# Patient Record
Sex: FEMALE | Race: WHITE | NOT HISPANIC OR LATINO | Employment: FULL TIME | URBAN - METROPOLITAN AREA
[De-identification: names, ages, dates, MRNs, and addresses within clinical notes are randomized per-mention and may not be internally consistent; named-entity substitution may affect disease eponyms.]

---

## 2021-08-23 ENCOUNTER — TELEPHONE (OUTPATIENT)
Dept: OBGYN CLINIC | Facility: CLINIC | Age: 61
End: 2021-08-23

## 2021-08-23 DIAGNOSIS — N95.2 ATROPHIC VAGINITIS: Primary | ICD-10-CM

## 2021-08-23 NOTE — TELEPHONE ENCOUNTER
Patient l/m requesting her Premarin 0 625 cream to be transmitted to [facility] as on file  She have a WA scheduled for 12/14/21 w/ Dr José Miguel Salamanca please address for Dr José Miguel Shoemaker since he is on vacation for 2 weeks

## 2021-12-14 ENCOUNTER — ANNUAL EXAM (OUTPATIENT)
Dept: OBGYN CLINIC | Facility: CLINIC | Age: 61
End: 2021-12-14
Payer: COMMERCIAL

## 2021-12-14 VITALS
DIASTOLIC BLOOD PRESSURE: 60 MMHG | WEIGHT: 149.2 LBS | SYSTOLIC BLOOD PRESSURE: 110 MMHG | HEIGHT: 65 IN | BODY MASS INDEX: 24.86 KG/M2

## 2021-12-14 DIAGNOSIS — N95.2 ATROPHIC VAGINITIS: ICD-10-CM

## 2021-12-14 DIAGNOSIS — Z12.31 BREAST CANCER SCREENING BY MAMMOGRAM: ICD-10-CM

## 2021-12-14 DIAGNOSIS — Z80.3 FAMILY HISTORY OF BREAST CANCER IN FIRST DEGREE RELATIVE: ICD-10-CM

## 2021-12-14 DIAGNOSIS — Z12.4 SCREENING FOR MALIGNANT NEOPLASM OF THE CERVIX: ICD-10-CM

## 2021-12-14 DIAGNOSIS — Z01.419 ROUTINE GYNECOLOGICAL EXAMINATION: Primary | ICD-10-CM

## 2021-12-14 PROCEDURE — S0612 ANNUAL GYNECOLOGICAL EXAMINA: HCPCS | Performed by: OBSTETRICS & GYNECOLOGY

## 2021-12-14 RX ORDER — LISINOPRIL 5 MG/1
1 TABLET ORAL DAILY
COMMUNITY

## 2021-12-21 LAB
CYTOLOGIST CVX/VAG CYTO: NORMAL
DX ICD CODE: NORMAL
HPV I/H RISK 4 DNA CVX QL PROBE+SIG AMP: NEGATIVE
Lab: NORMAL
OTHER STN SPEC: NORMAL
PATH REPORT.FINAL DX SPEC: NORMAL
SL AMB NOTE:: NORMAL
SL AMB SPECIMEN ADEQUACY: NORMAL
SL AMB TEST METHODOLOGY: NORMAL

## 2022-08-03 DIAGNOSIS — N95.2 ATROPHIC VAGINITIS: ICD-10-CM

## 2022-09-08 DIAGNOSIS — Z80.3 FAMILY HISTORY OF BREAST CANCER IN FIRST DEGREE RELATIVE: ICD-10-CM

## 2022-09-08 DIAGNOSIS — Z12.31 BREAST CANCER SCREENING BY MAMMOGRAM: ICD-10-CM

## 2022-09-26 NOTE — TELEPHONE ENCOUNTER
Patient called requesting refill of the Premarin cream to [facility] as on file  She states [facility] had tried to reach out to Dr Michi Yoon multiple time electronically and no response  Dr Ayaka Wilkes address and pt request a call back

## 2022-09-27 RX ORDER — CONJUGATED ESTROGENS 0.62 MG/G
CREAM VAGINAL
Qty: 30 G | Refills: 2 | Status: SHIPPED | OUTPATIENT
Start: 2022-09-27

## 2022-09-30 ENCOUNTER — TELEPHONE (OUTPATIENT)
Dept: OBGYN CLINIC | Facility: CLINIC | Age: 62
End: 2022-09-30

## 2022-09-30 NOTE — TELEPHONE ENCOUNTER
Patient l/m stating yesterday she called the pharmacy and they states Dr Lorrie Steven had not transmitted the rx for the premarin cream yet  Upon checking the chart, Dr Lorrie Steven did sent it on Tuesday but the electronic transmission failed  Called [facility] pharmacy and called in the script as written  L/m on pt's vm advising of the called in script and to call [facility] to make sure it is ready for

## 2022-12-20 ENCOUNTER — ANNUAL EXAM (OUTPATIENT)
Dept: OBGYN CLINIC | Facility: CLINIC | Age: 62
End: 2022-12-20

## 2022-12-20 VITALS
SYSTOLIC BLOOD PRESSURE: 140 MMHG | HEIGHT: 65 IN | BODY MASS INDEX: 25.02 KG/M2 | WEIGHT: 150.2 LBS | DIASTOLIC BLOOD PRESSURE: 78 MMHG

## 2022-12-20 DIAGNOSIS — Z01.419 ROUTINE GYNECOLOGICAL EXAMINATION: Primary | ICD-10-CM

## 2022-12-20 DIAGNOSIS — N95.2 ATROPHIC VAGINITIS: ICD-10-CM

## 2022-12-20 DIAGNOSIS — Z80.3 FAMILY HISTORY OF BREAST CANCER: ICD-10-CM

## 2022-12-20 DIAGNOSIS — Z12.31 ENCOUNTER FOR SCREENING MAMMOGRAM FOR MALIGNANT NEOPLASM OF BREAST: ICD-10-CM

## 2022-12-20 NOTE — PROGRESS NOTES
[address] [place] [address], [address], [facility], [facility]    ASSESSMENT/PLAN: Mendel Le is a 58 y o   who presents for annual gynecologic exam     Encounter for routine gynecologic examination  - Routine well woman exam completed today  - Cervical Cancer Screening: Current ASCCP Guidelines reviewed  Last Pap: 2021   Next Pap Due:   - HPV Vaccination status: Not immunized  - Contraceptive counseling discussed  Current contraception: none  - Breast Cancer Screening: Last Mammogram 2022, ordered  - Colorectal cancer screening was not ordered  - The following were reviewed in today's visit: breast self exam, mammography screening ordered, menopause and adequate intake of calcium and vitamin D    Additional problems addressed during this visit:  1  Routine gynecological examination    2  Atrophic vaginitis  Assessment & Plan:  Premarin refilled recently for another year      3  Encounter for screening mammogram for malignant neoplasm of breast  -     Mammo screening bilateral w 3d & cad; Future    4  Family history of breast cancer  -     Mammo screening bilateral w 3d & cad; Future      CC:  Annual Gynecologic Examination    HPI: Mendel Le is a 58 y o   who presents for annual gynecologic examination  HPI    The following portions of the patient's history were reviewed and updated as appropriate: She  has a past medical history of History of screening mammography (2022) and Hypertension  She  has a past surgical history that includes Mammo (historical) (Bilateral, 08/10/2020); Colonoscopy; Tonsillectomy;  section; and Myomectomy ()  Her family history includes Breast cancer (age of onset: 79) in her maternal aunt and paternal aunt; Coronary artery disease in her mother  She  reports that she has never smoked  She has never used smokeless tobacco  She reports current alcohol use  She reports that she does not use drugs    Current Outpatient Medications   Medication Sig Dispense Refill   • Calcium Carb-Cholecalciferol (Calcium Carbonate-Vitamin D3) 600-400 MG-UNIT TABS Take 1 tablet by mouth daily     • lisinopril (ZESTRIL) 5 mg tablet Take 1 tablet by mouth daily     • Multiple Vitamins-Minerals (HAIR/SKIN/NAILS/BIOTIN PO) Take 1 gum by mouth daily     • Multiple Vitamins-Minerals (Multivitamin Women 50+) TABS Take 1 tablet by mouth every 24 hours     • OMEGA 3-6-9 FATTY ACIDS PO Take 1 capsule by mouth daily     • Premarin vaginal cream INSERT 0 5 GM INTO THE VAGINA TWICE WEEKLY 30 g 2   • Probiotic Product (PROBIOTIC-10 PO) Take 1 tablet by mouth every 24 hours     • Specialty Vitamins Products (COLLAGEN ULTRA PO) Take by mouth daily       No current facility-administered medications for this visit  She is allergic to codeine, vicodin [hydrocodone-acetaminophen], and penicillins       Review of Systems      Objective:  /78   Ht 5' 5" (1 651 m)   Wt 68 1 kg (150 lb 3 2 oz)   Breastfeeding No   BMI 24 99 kg/m²    Physical Exam      PE:  General Appearance: alert and oriented, in no acute distress  HEENT: PERRL, thyroid without masses or tenderness  Breast: No masses, tenderness, skin changes, nipple D/C or axillary or supraclavicular adenopathy  Abdomen: Soft, non-tender, non-distended, no masses, no rebound or guarding  Pelvic:       External genitalia: Normal appearance, no abnormal pigmentation, no lesions or masses  Normal Bartholin's and [place]'s  Urinary system: Urethral meatus normal, bladder non-tender  Vaginal: normal mucosa without prolapse or lesions  Normal-appearing physiologic discharge      Cervix: Normal-appearing, well-epithelialized, no gross lesions or masses No cervical motion tenderness  Adnexa: No adnexal masses or tenderness noted  Uterus: Normal-sized, regular contour, midline, mobile, no uterine tenderness  Extremities: Normal range of motion     Skin: normal, no rash or abnormalities  Neurologic: alert, oriented x3  Psychiatric: Appropriate affect, mood stable, cooperative with exam

## 2023-11-20 ENCOUNTER — TELEPHONE (OUTPATIENT)
Dept: OBGYN CLINIC | Facility: CLINIC | Age: 63
End: 2023-11-20

## 2023-11-20 DIAGNOSIS — N95.2 ATROPHIC VAGINITIS: ICD-10-CM

## 2023-11-20 RX ORDER — CONJUGATED ESTROGENS 0.62 MG/G
1 CREAM VAGINAL 2 TIMES WEEKLY
Qty: 30 G | Refills: 2 | Status: SHIPPED | OUTPATIENT
Start: 2023-11-20

## 2023-11-20 NOTE — TELEPHONE ENCOUNTER
Cassidy Garcia scheduled WA for 01/24/2024. She is requesting Premarin cream renewal to [facility])   Medication renewal message forward to Dr. Ceci Sanchez.

## 2024-01-24 ENCOUNTER — ANNUAL EXAM (OUTPATIENT)
Dept: OBGYN CLINIC | Facility: CLINIC | Age: 64
End: 2024-01-24
Payer: COMMERCIAL

## 2024-01-24 VITALS
DIASTOLIC BLOOD PRESSURE: 82 MMHG | WEIGHT: 153 LBS | SYSTOLIC BLOOD PRESSURE: 132 MMHG | HEIGHT: 64 IN | BODY MASS INDEX: 26.12 KG/M2

## 2024-01-24 DIAGNOSIS — Z12.31 ENCOUNTER FOR SCREENING MAMMOGRAM FOR MALIGNANT NEOPLASM OF BREAST: ICD-10-CM

## 2024-01-24 DIAGNOSIS — N95.2 ATROPHIC VAGINITIS: ICD-10-CM

## 2024-01-24 DIAGNOSIS — Z01.419 ROUTINE GYNECOLOGICAL EXAMINATION: Primary | ICD-10-CM

## 2024-01-24 PROCEDURE — S0612 ANNUAL GYNECOLOGICAL EXAMINA: HCPCS | Performed by: OBSTETRICS & GYNECOLOGY

## 2024-01-24 RX ORDER — MULTIVIT WITH MINERALS/LUTEIN
TABLET ORAL EVERY 24 HOURS
COMMUNITY

## 2024-01-24 RX ORDER — CONJUGATED ESTROGENS 0.62 MG/G
1 CREAM VAGINAL 2 TIMES WEEKLY
Qty: 30 G | Refills: 3 | Status: SHIPPED | OUTPATIENT
Start: 2024-01-25

## 2024-01-24 RX ORDER — BACILLUS COAGULANS/INULIN 1B-250 MG
CAPSULE ORAL EVERY 24 HOURS
COMMUNITY

## 2024-01-24 RX ORDER — CRANBERRY FRUIT EXTRACT 200 MG
CAPSULE ORAL EVERY 24 HOURS
COMMUNITY

## 2024-01-24 NOTE — PROGRESS NOTES
[facility] [address], [address], [place] [address]    ASSESSMENT/PLAN: Jeane Fabian is a 63 y.o.  who presents for annual gynecologic exam.    Encounter for routine gynecologic examination  - Routine well woman exam completed today.  - Cervical Cancer Screening: Current ASCCP Guidelines reviewed. Last Pap: 2021 . Next Pap Due: next wellness  - HPV Vaccination status: Not immunized  - Contraceptive counseling discussed.  Current contraception: none  - Breast Cancer Screening: Last Mammogram 2022, ordered  - Colorectal cancer screening was not ordered.  - The following were reviewed in today's visit: breast self exam, mammography screening ordered, and use and side effects of HRT    Additional problems addressed during this visit:  1. Routine gynecological examination    2. Encounter for screening mammogram for malignant neoplasm of breast  -     Mammo screening bilateral w 3d & cad; Future    3. Atrophic vaginitis  Assessment & Plan:  Premarin cream working well. Refill sent    Orders:  -     estrogens, conjugated (Premarin) vaginal cream; Insert 1 g into the vagina 2 (two) times a week        CC:  Annual Gynecologic Examination    HPI: Jeane Fabian is a 63 y.o.  who presents for annual gynecologic examination.  HPI    The following portions of the patient's history were reviewed and updated as appropriate: She  has a past medical history of History of screening mammography (2022) and Hypertension.  She  has a past surgical history that includes Mammo (historical) (Bilateral, 08/10/2020); Colonoscopy; Tonsillectomy;  section; and Myomectomy ().  Her family history includes Breast cancer (age of onset: 70) in her maternal aunt and paternal aunt; Coronary artery disease in her mother; Hypertension in her father and mother.  She  reports that she has never smoked. She has never been exposed to tobacco smoke. She has never used smokeless  "tobacco. She reports current alcohol use of about 2.0 standard drinks of alcohol per week. She reports that she does not use drugs.  Current Outpatient Medications   Medication Sig Dispense Refill    Ascorbic Acid (vitamin C) 1000 MG tablet every 24 hours      Bacillus Coagulans-Inulin (Probiotic) 1-250 BILLION-MG CAPS every 24 hours      Calcium Carb-Cholecalciferol (Calcium Carbonate-Vitamin D3) 600-400 MG-UNIT TABS Take 1 tablet by mouth daily      [START ON 1/25/2024] estrogens, conjugated (Premarin) vaginal cream Insert 1 g into the vagina 2 (two) times a week 30 g 3    lisinopril (ZESTRIL) 5 mg tablet Take 1 tablet by mouth daily      Multiple Vitamins-Minerals (HAIR/SKIN/NAILS/BIOTIN PO) Take 1 gum by mouth daily      Multiple Vitamins-Minerals (Multivitamin Women 50+) TABS Take 1 tablet by mouth every 24 hours      OMEGA 3-6-9 FATTY ACIDS PO Take 1 capsule by mouth daily      Probiotic Product (PROBIOTIC-10 PO) Take 1 tablet by mouth every 24 hours      Red Yeast Rice Extract 600 MG CAPS every 24 hours      Specialty Vitamins Products (COLLAGEN ULTRA PO) Take by mouth daily      Multiple Vitamins-Minerals (HAIR SKIN AND NAILS FORMULA PO) as directed Orally       No current facility-administered medications for this visit.     She is allergic to codeine, vicodin [hydrocodone-acetaminophen], and penicillins..    Review of Systems      Objective:  /82 (BP Location: Left arm, Patient Position: Sitting, Cuff Size: Standard)   Ht 5' 4\" (1.626 m)   Wt 69.4 kg (153 lb)   BMI 26.26 kg/m²    Physical Exam      PE:  General Appearance: alert and oriented, in no acute distress.   HEENT: PERRL, thyroid without masses or tenderness  Breast: No masses, tenderness, skin changes, nipple D/C or axillary or supraclavicular adenopathy  Abdomen: Soft, non-tender, non-distended, no masses, no rebound or guarding.  Pelvic:       External genitalia: Normal appearance, no abnormal pigmentation, no lesions or masses. Normal " Bartholin's and [place]'s.      Urinary system: Urethral meatus normal, bladder non-tender.      Vaginal: normal mucosa without prolapse or lesions. Normal-appearing physiologic discharge      Cervix: Normal-appearing, well-epithelialized, no gross lesions or masses No cervical motion tenderness.      Adnexa: No adnexal masses or tenderness noted.      Uterus: Normal-sized, regular contour, midline, mobile, no uterine tenderness.  Extremities: Normal range of motion.   Skin: normal, no rash or abnormalities  Neurologic: alert, oriented x3  Psychiatric: Appropriate affect, mood stable, cooperative with exam.

## 2024-11-13 DIAGNOSIS — Z12.31 ENCOUNTER FOR SCREENING MAMMOGRAM FOR MALIGNANT NEOPLASM OF BREAST: ICD-10-CM

## 2024-12-24 ENCOUNTER — RESULTS FOLLOW-UP (OUTPATIENT)
Dept: OBGYN CLINIC | Facility: CLINIC | Age: 64
End: 2024-12-24

## 2025-01-28 ENCOUNTER — ANNUAL EXAM (OUTPATIENT)
Dept: OBGYN CLINIC | Facility: CLINIC | Age: 65
End: 2025-01-28
Payer: COMMERCIAL

## 2025-01-28 VITALS
DIASTOLIC BLOOD PRESSURE: 74 MMHG | HEIGHT: 64 IN | WEIGHT: 154 LBS | BODY MASS INDEX: 26.29 KG/M2 | SYSTOLIC BLOOD PRESSURE: 132 MMHG

## 2025-01-28 DIAGNOSIS — N95.2 ATROPHIC VAGINITIS: ICD-10-CM

## 2025-01-28 DIAGNOSIS — Z12.4 SCREENING FOR MALIGNANT NEOPLASM OF THE CERVIX: ICD-10-CM

## 2025-01-28 DIAGNOSIS — Z01.419 ROUTINE GYNECOLOGICAL EXAMINATION: Primary | ICD-10-CM

## 2025-01-28 DIAGNOSIS — Z12.31 ENCOUNTER FOR SCREENING MAMMOGRAM FOR MALIGNANT NEOPLASM OF BREAST: ICD-10-CM

## 2025-01-28 PROCEDURE — S0612 ANNUAL GYNECOLOGICAL EXAMINA: HCPCS | Performed by: OBSTETRICS & GYNECOLOGY

## 2025-01-28 RX ORDER — CONJUGATED ESTROGENS 0.62 MG/G
1 CREAM VAGINAL 2 TIMES WEEKLY
Qty: 30 G | Refills: 3 | Status: SHIPPED | OUTPATIENT
Start: 2025-01-30

## 2025-01-28 NOTE — PROGRESS NOTES
[facility] - [address], [address], [place] [address]    ASSESSMENT/PLAN: Jeane Fabian is a 64 y.o.  who presents for annual gynecologic exam.    Encounter for routine gynecologic examination  - Routine well woman exam completed today.  - Cervical Cancer Screening: Current ASCCP Guidelines reviewed. Last Pap: 2021 . Next Pap Due: today  - HPV Vaccination status: Not immunized  - Contraceptive counseling discussed.  Current contraception: none  - Breast Cancer Screening: Last Mammogram 2024, ordered  - Colorectal cancer screening was not ordered.  - The following were reviewed in today's visit: breast self exam, mammography screening ordered, and use and side effects of HRT    Additional problems addressed during this visit:  1. Routine gynecological examination  2. Encounter for screening mammogram for malignant neoplasm of breast  -     Mammo screening bilateral w 3d and cad; Future  3. Screening for malignant neoplasm of the cervix  -     IGP, Aptima HPV, Rfx 16/18,45  4. Atrophic vaginitis  -     estrogens, conjugated (Premarin) vaginal cream; Insert 1 g into the vagina 2 (two) times a week      CC:  Annual Gynecologic Examination    HPI: Jeane Fabian is a 64 y.o.  who presents for annual gynecologic examination.  HPI    The following portions of the patient's history were reviewed and updated as appropriate: She  has a past medical history of History of screening mammography (2022) and Hypertension.  She  has a past surgical history that includes Mammo (historical) (Bilateral, 08/10/2020); Colonoscopy; Tonsillectomy;  section; and Myomectomy ().  Her family history includes Breast cancer (age of onset: 70) in her maternal aunt and paternal aunt; Coronary artery disease in her mother; Hypertension in her father and mother.  She  reports that she has never smoked. She has never been exposed to tobacco smoke. She has never used smokeless  "tobacco. She reports current alcohol use of about 2.0 standard drinks of alcohol per week. She reports that she does not use drugs.  Current Outpatient Medications   Medication Sig Dispense Refill    Ascorbic Acid (vitamin C) 1000 MG tablet every 24 hours      Bacillus Coagulans-Inulin (Probiotic) 1-250 BILLION-MG CAPS every 24 hours      Calcium Carb-Cholecalciferol (Calcium Carbonate-Vitamin D3) 600-400 MG-UNIT TABS Take 1 tablet by mouth daily      [START ON 1/30/2025] estrogens, conjugated (Premarin) vaginal cream Insert 1 g into the vagina 2 (two) times a week 30 g 3    lisinopril (ZESTRIL) 10 mg tablet Take 10 mg by mouth daily      Multiple Vitamins-Minerals (HAIR/SKIN/NAILS/BIOTIN PO) Take 1 gum by mouth daily      Multiple Vitamins-Minerals (Multivitamin Women 50+) TABS Take 1 tablet by mouth every 24 hours      OMEGA 3-6-9 FATTY ACIDS PO Take 1 capsule by mouth daily      Red Yeast Rice Extract 600 MG CAPS every 24 hours      Specialty Vitamins Products (COLLAGEN ULTRA PO) Take by mouth daily      Multiple Vitamins-Minerals (HAIR SKIN AND NAILS FORMULA PO) as directed Orally      Probiotic Product (PROBIOTIC-10 PO) Take 1 tablet by mouth every 24 hours (Patient not taking: Reported on 1/28/2025)       No current facility-administered medications for this visit.     She is allergic to codeine, vicodin [hydrocodone-acetaminophen], and penicillins..    Review of Systems      Objective:  /74 (BP Location: Right arm, Patient Position: Sitting, Cuff Size: Standard)   Ht 5' 4\" (1.626 m)   Wt 69.9 kg (154 lb)   BMI 26.43 kg/m²    Physical Exam      PE:  General Appearance: alert and oriented, in no acute distress.   HEENT: PERRL, thyroid without masses or tenderness  Breast: No masses, tenderness, skin changes, nipple D/C or axillary or supraclavicular adenopathy  Abdomen: Soft, non-tender, non-distended, no masses, no rebound or guarding.  Pelvic:       External genitalia: Normal appearance, no abnormal " pigmentation, no lesions or masses. Normal Bartholin's and [place]'s.      Urinary system: Urethral meatus normal, bladder non-tender.      Vaginal: normal mucosa without prolapse or lesions. Normal-appearing physiologic discharge      Cervix: Normal-appearing, well-epithelialized, no gross lesions or masses No cervical motion tenderness.      Adnexa: No adnexal masses or tenderness noted.      Uterus: Normal-sized, regular contour, midline, mobile, no uterine tenderness.  Extremities: Normal range of motion.   Skin: normal, no rash or abnormalities  Neurologic: alert, oriented x3  Psychiatric: Appropriate affect, mood stable, cooperative with exam.

## 2025-02-01 LAB
CYTOLOGIST CVX/VAG CYTO: NORMAL
DX ICD CODE: NORMAL
HPV GENOTYPE REFLEX: NORMAL
HPV I/H RISK 4 DNA CVX QL PROBE+SIG AMP: NEGATIVE
OTHER STN SPEC: NORMAL
PATH REPORT.FINAL DX SPEC: NORMAL
SL AMB NOTE:: NORMAL
SL AMB SPECIMEN ADEQUACY: NORMAL
SL AMB TEST METHODOLOGY: NORMAL

## 2025-03-23 DIAGNOSIS — N95.2 ATROPHIC VAGINITIS: ICD-10-CM

## 2025-03-25 RX ORDER — CONJUGATED ESTROGENS 0.62 MG/G
CREAM VAGINAL
Qty: 30 G | Refills: 3 | Status: SHIPPED | OUTPATIENT
Start: 2025-03-25